# Patient Record
Sex: FEMALE | Race: WHITE | URBAN - METROPOLITAN AREA
[De-identification: names, ages, dates, MRNs, and addresses within clinical notes are randomized per-mention and may not be internally consistent; named-entity substitution may affect disease eponyms.]

---

## 2017-08-17 ENCOUNTER — TELEPHONE (OUTPATIENT)
Dept: NUTRITION | Age: 37
End: 2017-08-17

## 2017-08-17 NOTE — TELEPHONE ENCOUNTER
Nutrition Counseling: Called pt regarding Dr. Nicole Odom referral for nutrition counseling. Discussed pt's health concerns and details of nutrition counseling program. Explained insurance coverage details and self-pay rates. Pt is interested and would RD to mail brochure and self-pay rates for her to review. She'll call to schedule when able.     Nereyda Jimenez, [address] [place] [address],   Outpatient Dietitian  Office: [phone]  Cell: [phone]

## 2017-11-06 ENCOUNTER — DOCUMENTATION ONLY (OUTPATIENT)
Dept: NUTRITION | Age: 37
End: 2017-11-06

## 2017-11-06 NOTE — PROGRESS NOTES
Nutrition Counseling:  Pt has not contacted RD further to schedule appt. Will close referral for this office and notify referring physician.     Tigist Zafar, [address],   Outpatient Dietitian  Office: [phone]  Cell: [phone]